# Patient Record
Sex: FEMALE | Race: WHITE | NOT HISPANIC OR LATINO | Employment: FULL TIME | URBAN - METROPOLITAN AREA
[De-identification: names, ages, dates, MRNs, and addresses within clinical notes are randomized per-mention and may not be internally consistent; named-entity substitution may affect disease eponyms.]

---

## 2019-08-27 ENCOUNTER — TELEPHONE (OUTPATIENT)
Dept: NEUROLOGY | Facility: CLINIC | Age: 49
End: 2019-08-27

## 2019-10-15 ENCOUNTER — CONSULT (OUTPATIENT)
Dept: NEUROLOGY | Facility: CLINIC | Age: 49
End: 2019-10-15
Payer: COMMERCIAL

## 2019-10-15 VITALS
HEIGHT: 66 IN | BODY MASS INDEX: 24.27 KG/M2 | SYSTOLIC BLOOD PRESSURE: 140 MMHG | WEIGHT: 151 LBS | HEART RATE: 79 BPM | DIASTOLIC BLOOD PRESSURE: 94 MMHG

## 2019-10-15 DIAGNOSIS — Z91.041 CONTRAST MEDIA ALLERGY: ICD-10-CM

## 2019-10-15 DIAGNOSIS — R53.1 LEFT-SIDED WEAKNESS: ICD-10-CM

## 2019-10-15 DIAGNOSIS — G37.9 CNS DEMYELINATION (HCC): Primary | ICD-10-CM

## 2019-10-15 PROCEDURE — 99205 OFFICE O/P NEW HI 60 MIN: CPT | Performed by: PSYCHIATRY & NEUROLOGY

## 2019-10-15 RX ORDER — PREDNISONE 10 MG/1
TABLET ORAL
Qty: 15 TABLET | Refills: 0 | Status: SHIPPED | OUTPATIENT
Start: 2019-10-15

## 2019-10-15 RX ORDER — MULTIVIT-MIN/IRON/FOLIC ACID/K 18-600-40
CAPSULE ORAL
COMMUNITY

## 2019-10-15 RX ORDER — CITALOPRAM 20 MG/1
20 TABLET ORAL
COMMUNITY
Start: 2019-06-18

## 2019-10-15 NOTE — PROGRESS NOTES
[facility]IPLE [facility]ER[facility]  PATIENT:  Brodie Holter  MRN:  [id number]  :  1970  DATE OF SERVICE:  10/15/2019  Assessment/Plan:     Problem List Items Addressed This Visit        Nervous and Auditory    CNS demyelination ([facility] [address] ) - Primary    Relevant Orders    MRI cervical spine with and without contrast    MRI thoracic spine with and without contrast    Sedimentation rate, automated    RF Screen w/ Reflex to Titer    ANCA Screen With MPO and PR3 With Reflex To ANCA Titer    C-reactive protein    Anti-DNA antibody, double-stranded    Sjogren's Antibodies    Vitamin B12    Anti-microsomal antibody    BUN    Creatinine, serum    Left-sided weakness      Other Visit Diagnoses     Contrast media allergy        Relevant Medications    predniSONE 10 mg tablet         Mrs Poncho Quezada has presented for evaluation of abnormal brain MRI with longstanding fatigue and sensory dysfunction  Patient described having longstanding concern for MS but multiple prior evaluations including LP were non-confirming  MRI brain was personally reviewed - we agreed that patient does have significant amount of non-specific subcortical white matter changes , with no corpus callosum involvement, no infratentorial lesions, and no T1W black holes appreciated  Concern for MS is low, based on her clinical presentation and age,though not entirely excluded  MRI C/T spine will be further advised, along with blood work for connective tissue disorder  SLP or cognitive therapy will be further advised due to progressive worsening of her cognitive function, with increased forgetfulness  Less likely repeat LP, unless clinically indicated to R/O MS second time  Steroids will be further advised for contrast allergic reaction  Patient is to follow in 6-8 weeks        Greater than 50% of the 60 minutes evaluation was a face-to-face discussion regarding  the pathophysiology of her current symptoms and further plan, as well as counseling, educating, and coordinating the patient's care  Subjective: fatigue, abnormal brain MRI    HPI History of Present Illness    Mrs Marguerite Colby is a 48 yo female who was referred to [place] [address] for evaluation of abnormal brain MRI  Patient has anxiety/blood clotting disorder, hypothyroidism, connective tissues disease, hyperglycemia, blurred vision, cervical spondylosis, frequent falls, sciatica and eye pain? MRI brain was completed 2019 and compared to 2017 with description of multiple small subcortical white matter changes noted, single area in left temporal lobe is mildly enlarged  No description of T1W black holes or corpus callosum changes noted; radiology team was not concerned for CNS demyelination  Patient was last seen by Raymundo Leach on 19  Patient has been describing Fatigue over 4 years, and cognitive dysfunction, with numbness and tingling in hands and feet for several years, with Vit D helped with the symptoms  Family history twin sister SLE/RA and stage III breast cancer  The following portions of the patient's history were reviewed and updated as appropriate:   She  has a past medical history of Arthritis, Fatigue, Memory loss, and Migraine  She   Patient Active Problem List    Diagnosis Date Noted    CNS demyelination ([facility] [address] ) 10/15/2019    Left-sided weakness 10/15/2019     She  has a past surgical history that includes  section; Dilation and evacuation; and Hysterectomy  Her family history includes Arthritis in her mother; Breast cancer in her sister; Dementia in her paternal grandmother; Diabetes type II in her father and mother; Heart disease in her brother; Hypertension in her father and mother  She  reports that she has never smoked  She has never used smokeless tobacco  She reports that she drinks alcohol  She reports that she does not use drugs    Current Outpatient Medications   Medication Sig Dispense Refill    Apple Lance Vn-Grn Tea-Bit Or-Cr (APPLE CIDER VINEGAR PLUS PO) Take by mouth      aspirin 81 MG tablet Take 81 mg by mouth daily      b complex vitamins tablet Take 1 tablet by mouth daily      Cholecalciferol (VITAMIN D) 2000 units CAPS Take by mouth      citalopram (CeleXA) 20 mg tablet Take 20 mg by mouth      Multiple Vitamin (MULTIVITAMINS PO) Take by mouth      predniSONE 10 mg tablet Take 5 tabs PO 13 hours prior to MRI, then 5 tabs 7 hours prior to MRI and 1 hour prior to imaging 15 tablet 0     No current facility-administered medications for this visit  Current Outpatient Medications on File Prior to Visit   Medication Sig    Apple Lance Vn-Grn Tea-Bit Or-Cr (APPLE CIDER VINEGAR PLUS PO) Take by mouth    aspirin 81 MG tablet Take 81 mg by mouth daily    b complex vitamins tablet Take 1 tablet by mouth daily    Cholecalciferol (VITAMIN D) 2000 units CAPS Take by mouth    citalopram (CeleXA) 20 mg tablet Take 20 mg by mouth    Multiple Vitamin (MULTIVITAMINS PO) Take by mouth     No current facility-administered medications on file prior to visit  She is allergic to iodides            Objective:    Blood pressure 140/94, pulse 79, height 5' 5 5" (1 664 m), weight 68 5 kg (151 lb)  Physical Exam/Neurological Exam  CONSTITUTIONAL: NAD, pleasant  NECK: supple, no lymphadenopathy, no thyromegaly, no JVD  CARDIOVASCULAR: RRR, normal S1S2, no murmurs, no rubs  RESP: clear to auscultation bilaterally, no wheezes/rhonchi/rales  ABDOMEN: soft, non tender, non distended  SKIN: no rash or skin lesions  EXTREMITIES: no edema, pulses 2+bilaterally  PSYCH: appropriate mood and affect  NEUROLOGIC COMPREHENSIVE EXAM: Patient is oriented to person, place and time, NAD; appropriate affect  CN II, III, IV, V, VI, VII,VIII,IX,X,XI-XII intact with EOMI, PERRLA, OKN intact, VF grossly intact, fundi poorly visualized secondary to pupillary constriction; symmetric face noted   Motor: 5/5 UE/LE bilateral symmetric, LUE/LLE 5-/5 through, with 5/5 dorsiflexion; Sensory: intact to light touch and pinprick bilaterally; normal vibration sensation feet bilaterally; Coordination within normal limits on FTN and CARLOS testing; DTR: 2/4 through, no Babinski, no clonus  Tandem gait is abnormal  Romberg: negative  ROS:  12 points of review of system was reviewed with the patient and was unremarkable with exception: see HPI  Review of Systems   Constitutional: Positive for fatigue  HENT: Positive for tinnitus  Eyes: Positive for pain  Respiratory: Negative  Cardiovascular: Negative  Gastrointestinal: Negative  Endocrine: Negative  Genitourinary: Positive for frequency and urgency  Musculoskeletal: Positive for arthralgias, back pain, gait problem and neck pain  Skin: Negative  Allergic/Immunologic: Negative  Neurological: Positive for headaches  Face numbness, falls, tingling, clumsiness   Hematological: Negative  Psychiatric/Behavioral: Positive for confusion and sleep disturbance

## 2019-10-19 ENCOUNTER — APPOINTMENT (OUTPATIENT)
Dept: LAB | Facility: MEDICAL CENTER | Age: 49
End: 2019-10-19
Payer: COMMERCIAL

## 2019-10-19 DIAGNOSIS — G37.9 CNS DEMYELINATION (HCC): ICD-10-CM

## 2019-10-19 LAB
BUN SERPL-MCNC: 15 MG/DL (ref 5–25)
CREAT SERPL-MCNC: 0.84 MG/DL (ref 0.6–1.3)
CRP SERPL QL: <3 MG/L
ERYTHROCYTE [SEDIMENTATION RATE] IN BLOOD: 8 MM/HOUR (ref 0–20)
GFR SERPL CREATININE-BSD FRML MDRD: 81 ML/MIN/1.73SQ M
VIT B12 SERPL-MCNC: 873 PG/ML (ref 100–900)

## 2019-10-19 PROCEDURE — 82565 ASSAY OF CREATININE: CPT

## 2019-10-19 PROCEDURE — 86225 DNA ANTIBODY NATIVE: CPT

## 2019-10-19 PROCEDURE — 86376 MICROSOMAL ANTIBODY EACH: CPT

## 2019-10-19 PROCEDURE — 86235 NUCLEAR ANTIGEN ANTIBODY: CPT

## 2019-10-19 PROCEDURE — 86255 FLUORESCENT ANTIBODY SCREEN: CPT

## 2019-10-19 PROCEDURE — 85652 RBC SED RATE AUTOMATED: CPT

## 2019-10-19 PROCEDURE — 86140 C-REACTIVE PROTEIN: CPT

## 2019-10-19 PROCEDURE — 86430 RHEUMATOID FACTOR TEST QUAL: CPT

## 2019-10-19 PROCEDURE — 84520 ASSAY OF UREA NITROGEN: CPT

## 2019-10-19 PROCEDURE — 36415 COLL VENOUS BLD VENIPUNCTURE: CPT

## 2019-10-19 PROCEDURE — 82607 VITAMIN B-12: CPT

## 2019-10-20 LAB — THYROPEROXIDASE AB SERPL-ACNC: 11 IU/ML (ref 0–34)

## 2019-10-21 LAB
DSDNA AB SER-ACNC: <1 IU/ML (ref 0–9)
ENA SS-A AB SER-ACNC: <0.2 AI (ref 0–0.9)
ENA SS-B AB SER-ACNC: <0.2 AI (ref 0–0.9)
RHEUMATOID FACT SER QL LA: NEGATIVE

## 2019-10-23 LAB
C-ANCA TITR SER IF: NORMAL TITER
MYELOPEROXIDASE AB SER IA-ACNC: <9 U/ML (ref 0–9)
P-ANCA ATYPICAL TITR SER IF: NORMAL TITER
P-ANCA TITR SER IF: NORMAL TITER
PROTEINASE3 AB SER IA-ACNC: <3.5 U/ML (ref 0–3.5)

## 2019-11-10 ENCOUNTER — HOSPITAL ENCOUNTER (OUTPATIENT)
Dept: MRI IMAGING | Facility: HOSPITAL | Age: 49
Discharge: HOME | End: 2019-11-10
Attending: PSYCHIATRY & NEUROLOGY
Payer: COMMERCIAL

## 2019-11-10 DIAGNOSIS — G37.9 CNS DEMYELINATION (HCC): ICD-10-CM

## 2019-11-10 PROCEDURE — 72156 MRI NECK SPINE W/O & W/DYE: CPT

## 2019-11-10 PROCEDURE — 72157 MRI CHEST SPINE W/O & W/DYE: CPT

## 2019-11-10 PROCEDURE — A9585 GADOBUTROL INJECTION: HCPCS | Performed by: PSYCHIATRY & NEUROLOGY

## 2019-11-10 RX ADMIN — GADOBUTROL 6 ML: 604.72 INJECTION INTRAVENOUS at 12:14

## 2019-11-12 ENCOUNTER — TELEPHONE (OUTPATIENT)
Dept: NEUROLOGY | Facility: CLINIC | Age: 49
End: 2019-11-12

## 2019-11-12 NOTE — TELEPHONE ENCOUNTER
Called and left a message on pt's answering machine for a call back    ----- Message from Olvin Pinedo MD sent at 11/12/2019  4:37 PM EST -----  Please review abnormal MRI with the patient- radiographic signs of bilateral C7 radiculitis noted due to advanced spinal arthritis

## 2019-11-13 ENCOUNTER — TELEPHONE (OUTPATIENT)
Dept: NEUROLOGY | Facility: CLINIC | Age: 49
End: 2019-11-13

## 2019-11-13 NOTE — TELEPHONE ENCOUNTER
----- Message from Jazz Etienne MD sent at 11/12/2019  7:03 PM EST -----  Please review abnormal MRI with the patient- NORMAL spinal cord, but liver cyst is noted -she is to follow with PCP

## 2020-01-29 ENCOUNTER — TELEPHONE (OUTPATIENT)
Dept: NEUROLOGY | Facility: CLINIC | Age: 50
End: 2020-01-29